# Patient Record
Sex: FEMALE | URBAN - METROPOLITAN AREA
[De-identification: names, ages, dates, MRNs, and addresses within clinical notes are randomized per-mention and may not be internally consistent; named-entity substitution may affect disease eponyms.]

---

## 2017-07-15 ENCOUNTER — APPOINTMENT (RX ONLY)
Dept: URBAN - METROPOLITAN AREA CLINIC 15 | Facility: CLINIC | Age: 52
Setting detail: DERMATOLOGY
End: 2017-07-15

## 2017-07-15 DIAGNOSIS — Z41.9 ENCOUNTER FOR PROCEDURE FOR PURPOSES OTHER THAN REMEDYING HEALTH STATE, UNSPECIFIED: ICD-10-CM

## 2017-07-15 PROCEDURE — ? MEDICAL CONSULTATION: COOLSCULPTING

## 2017-07-15 NOTE — HPI: COSMETIC (COOLSCULPTING)
Have You Had Previous Coolsculpting Treatments Before?: has not had previous treatments
Additional History: The patient is interested on the Coolsculpting procedure. She would like to reduce the fat bulge she has on her outer thighs, her upper inner thighs and her flanks.